# Patient Record
Sex: MALE | Employment: OTHER | URBAN - METROPOLITAN AREA
[De-identification: names, ages, dates, MRNs, and addresses within clinical notes are randomized per-mention and may not be internally consistent; named-entity substitution may affect disease eponyms.]

---

## 2020-01-13 ENCOUNTER — OFFICE VISIT (OUTPATIENT)
Dept: SURGERY | Facility: CLINIC | Age: 35
End: 2020-01-13
Payer: COMMERCIAL

## 2020-01-13 VITALS — TEMPERATURE: 99 F | SYSTOLIC BLOOD PRESSURE: 114 MMHG | HEART RATE: 56 BPM | DIASTOLIC BLOOD PRESSURE: 72 MMHG

## 2020-01-13 DIAGNOSIS — R82.90 URINE FINDINGS ABNORMAL: Primary | ICD-10-CM

## 2020-01-13 DIAGNOSIS — R68.82 DECREASED LIBIDO: ICD-10-CM

## 2020-01-13 LAB
APPEARANCE: CLEAR
MULTISTIX LOT#: ABNORMAL NUMERIC
PH, URINE: 5 (ref 4.5–8)
SPECIFIC GRAVITY: 1.01 (ref 1–1.03)
URINE-COLOR: YELLOW
UROBILINOGEN,SEMI-QN: 5 MG/DL (ref 0–1.9)

## 2020-01-13 PROCEDURE — 81003 URINALYSIS AUTO W/O SCOPE: CPT | Performed by: UROLOGY

## 2020-01-13 PROCEDURE — 99203 OFFICE O/P NEW LOW 30 MIN: CPT | Performed by: UROLOGY

## 2020-01-13 RX ORDER — MULTIVITAMIN
TABLET ORAL
COMMUNITY
End: 2021-02-17 | Stop reason: ALTCHOICE

## 2020-01-13 NOTE — PROGRESS NOTES
Rooming Clinician:     Estela Dobbs is a 30year old male. Patient presents with: Other: penile fracture about 6 years ago        HPI:     Patient comes to the office with a history of having had some bloody ejaculate 8 years ago.   At that time he felt a lesions  HEENT: atraumatic, normocephalic,ears and throat are clear  NECK: supple  LUNGS: normal respiratory motion without distress  CARDIO: normal peripheral perfusion  ABDOMEN: no masses,  tenderness, or hernia  : The penis is uncircumcised.   Urethral

## 2020-01-18 ENCOUNTER — LAB ENCOUNTER (OUTPATIENT)
Dept: LAB | Age: 35
End: 2020-01-18
Attending: INTERNAL MEDICINE
Payer: COMMERCIAL

## 2020-01-18 ENCOUNTER — OFFICE VISIT (OUTPATIENT)
Dept: INTERNAL MEDICINE CLINIC | Facility: CLINIC | Age: 35
End: 2020-01-18
Payer: COMMERCIAL

## 2020-01-18 VITALS
SYSTOLIC BLOOD PRESSURE: 111 MMHG | HEIGHT: 64 IN | WEIGHT: 143 LBS | DIASTOLIC BLOOD PRESSURE: 68 MMHG | HEART RATE: 57 BPM | TEMPERATURE: 97 F | BODY MASS INDEX: 24.41 KG/M2

## 2020-01-18 DIAGNOSIS — R06.81 APNEA: ICD-10-CM

## 2020-01-18 DIAGNOSIS — Z11.3 SCREENING FOR STD (SEXUALLY TRANSMITTED DISEASE): ICD-10-CM

## 2020-01-18 DIAGNOSIS — Z00.00 ANNUAL PHYSICAL EXAM: ICD-10-CM

## 2020-01-18 DIAGNOSIS — Z23 NEED FOR TDAP VACCINATION: ICD-10-CM

## 2020-01-18 DIAGNOSIS — R06.83 SNORING: ICD-10-CM

## 2020-01-18 DIAGNOSIS — Z23 NEED FOR INFLUENZA VACCINATION: ICD-10-CM

## 2020-01-18 DIAGNOSIS — R68.82 DECREASED LIBIDO: ICD-10-CM

## 2020-01-18 DIAGNOSIS — Z00.00 ANNUAL PHYSICAL EXAM: Primary | ICD-10-CM

## 2020-01-18 LAB
ALBUMIN SERPL-MCNC: 4.2 G/DL (ref 3.4–5)
ALBUMIN/GLOB SERPL: 1.3 {RATIO} (ref 1–2)
ALP LIVER SERPL-CCNC: 96 U/L (ref 45–117)
ALT SERPL-CCNC: 50 U/L (ref 16–61)
ANION GAP SERPL CALC-SCNC: 5 MMOL/L (ref 0–18)
AST SERPL-CCNC: 20 U/L (ref 15–37)
BILIRUB SERPL-MCNC: 1 MG/DL (ref 0.1–2)
BUN BLD-MCNC: 13 MG/DL (ref 7–18)
BUN/CREAT SERPL: 12 (ref 10–20)
CALCIUM BLD-MCNC: 9.3 MG/DL (ref 8.5–10.1)
CHLORIDE SERPL-SCNC: 110 MMOL/L (ref 98–112)
CHOLEST SMN-MCNC: 195 MG/DL (ref ?–200)
CO2 SERPL-SCNC: 26 MMOL/L (ref 21–32)
CREAT BLD-MCNC: 1.08 MG/DL (ref 0.7–1.3)
DEPRECATED RDW RBC AUTO: 36.3 FL (ref 35.1–46.3)
ERYTHROCYTE [DISTWIDTH] IN BLOOD BY AUTOMATED COUNT: 11.4 % (ref 11–15)
EST. AVERAGE GLUCOSE BLD GHB EST-MCNC: 97 MG/DL (ref 68–126)
GLOBULIN PLAS-MCNC: 3.3 G/DL (ref 2.8–4.4)
GLUCOSE BLD-MCNC: 98 MG/DL (ref 70–99)
HBA1C MFR BLD HPLC: 5 % (ref ?–5.7)
HBV SURFACE AG SER-ACNC: <0.1 [IU]/L
HBV SURFACE AG SERPL QL IA: NONREACTIVE
HCT VFR BLD AUTO: 48.2 % (ref 39–53)
HCV AB SERPL QL IA: NONREACTIVE
HDLC SERPL-MCNC: 36 MG/DL (ref 40–59)
HGB BLD-MCNC: 16.1 G/DL (ref 13–17.5)
LDLC SERPL CALC-MCNC: 123 MG/DL (ref ?–100)
M PROTEIN MFR SERPL ELPH: 7.5 G/DL (ref 6.4–8.2)
MCH RBC QN AUTO: 29.1 PG (ref 26–34)
MCHC RBC AUTO-ENTMCNC: 33.4 G/DL (ref 31–37)
MCV RBC AUTO: 87 FL (ref 80–100)
NONHDLC SERPL-MCNC: 159 MG/DL (ref ?–130)
OSMOLALITY SERPL CALC.SUM OF ELEC: 292 MOSM/KG (ref 275–295)
PATIENT FASTING Y/N/NP: YES
PATIENT FASTING Y/N/NP: YES
PLATELET # BLD AUTO: 231 10(3)UL (ref 150–450)
POTASSIUM SERPL-SCNC: 3.9 MMOL/L (ref 3.5–5.1)
RBC # BLD AUTO: 5.54 X10(6)UL (ref 4.3–5.7)
SODIUM SERPL-SCNC: 141 MMOL/L (ref 136–145)
TRIGL SERPL-MCNC: 182 MG/DL (ref 30–149)
TSI SER-ACNC: 0.48 MIU/ML (ref 0.36–3.74)
VLDLC SERPL CALC-MCNC: 36 MG/DL (ref 0–30)
WBC # BLD AUTO: 6.2 X10(3) UL (ref 4–11)

## 2020-01-18 PROCEDURE — 90686 IIV4 VACC NO PRSV 0.5 ML IM: CPT | Performed by: INTERNAL MEDICINE

## 2020-01-18 PROCEDURE — 90471 IMMUNIZATION ADMIN: CPT | Performed by: INTERNAL MEDICINE

## 2020-01-18 PROCEDURE — 83036 HEMOGLOBIN GLYCOSYLATED A1C: CPT

## 2020-01-18 PROCEDURE — 87340 HEPATITIS B SURFACE AG IA: CPT

## 2020-01-18 PROCEDURE — 84403 ASSAY OF TOTAL TESTOSTERONE: CPT

## 2020-01-18 PROCEDURE — 36415 COLL VENOUS BLD VENIPUNCTURE: CPT

## 2020-01-18 PROCEDURE — 80061 LIPID PANEL: CPT

## 2020-01-18 PROCEDURE — 99385 PREV VISIT NEW AGE 18-39: CPT | Performed by: INTERNAL MEDICINE

## 2020-01-18 PROCEDURE — 86780 TREPONEMA PALLIDUM: CPT

## 2020-01-18 PROCEDURE — 84443 ASSAY THYROID STIM HORMONE: CPT

## 2020-01-18 PROCEDURE — 80053 COMPREHEN METABOLIC PANEL: CPT

## 2020-01-18 PROCEDURE — 84402 ASSAY OF FREE TESTOSTERONE: CPT

## 2020-01-18 PROCEDURE — 86803 HEPATITIS C AB TEST: CPT

## 2020-01-18 PROCEDURE — 85027 COMPLETE CBC AUTOMATED: CPT

## 2020-01-18 PROCEDURE — 87389 HIV-1 AG W/HIV-1&-2 AB AG IA: CPT

## 2020-01-18 PROCEDURE — 90472 IMMUNIZATION ADMIN EACH ADD: CPT | Performed by: INTERNAL MEDICINE

## 2020-01-18 PROCEDURE — 90715 TDAP VACCINE 7 YRS/> IM: CPT | Performed by: INTERNAL MEDICINE

## 2020-01-18 NOTE — PATIENT INSTRUCTIONS
Some General Health and Preventive Recommendations:  • Perform moderate to vigorous physical activity of at least 30-45 minutes a day, as tolerated, 3 to 4 times per week; include cardiovascular exercise like running/elliptical/swimming, as well as resis possible disorders or diseases in people who don't have any symptoms.  The goal is to find a disease early so lifestyle changes can be made and you can be watched more closely to reduce the risk of disease, or to detect it early enough to treat it most effe given at least 4 weeks after the first dose   Hepatitis A Men at increased risk for infection – talk with your healthcare provider 2 doses given at least 6 months apart   Hepatitis B Men at increased risk for infection – talk with your healthcare provider years of age, who are not up-to-date on their childhood immunizations, should receive all appropriate catch-up vaccines recommended by the CDC. Date Last Reviewed: 2/1/2017  © 9798-1160 [employer]. [address] [facility], [address] [place] [address] [place]. lose weight, tone your muscles, and make your lungs work better. These changes may help improve your snoring. Ask your healthcare provider about an exercise program like walking, or something else that you enjoy.   Unblock your nose  If something blocks you apnea.      Snoring Obstructive sleep apnea   Snoring  If your throat structures are too large or the muscles relax too much during sleep, the air passage may be partly blocked for a while (temporarily).  But over time the air gets past. As air from the nos

## 2020-01-18 NOTE — PROGRESS NOTES
History of Present Illness   Patient ID: Bere Reardon is a 30year old male. Chief Complaint: Physical      Bere Reardon is a pleasant 30year old male who presents for annual physical exam.   He feels well today.   He does state that he snores heavily, hi Height: 5' 4\" (1.626 m)     Body mass index is 24.55 kg/m². BP Readings from Last 3 Encounters:  01/18/20 : 111/68  01/13/20 : 114/72    Physical Exam   Vitals reviewed. Constitutional: He is oriented to person, place, and time.  Vital signs are normal. The ASCVD Risk score (Neil Price et al., 2013) failed to calculate for the following reasons:     The 2013 ASCVD risk score is only valid for ages 36 to 78    Medical History    Reviewed allergies:  No Known Allergies     Reviewed:  Patient Active Problem Return if symptoms worsen or fail to improve, for 1 1 Josh Rdz; [address]       Alba Dyson MD  Internal Medicine    Medications This Visit:  Requested Prescriptions      No prescriptions requested or ordered in this encounter ? Ensure you are getting adequate sleep of at least 7-8 hours per night, meditation can help you relax, try melatonin 0.5mg -1 mg, avoid electronic at night. ? Practice safe sex with the use of condoms. ?  Use sunscreen whenever you are out in the sun a Diabetes mellitus, type 2 All men who have no symptoms but are overweight or obese and have 1 or more other risk factors for diabetes At least every 3 years (yearly if blood sugar has already started to rise)   Diabetes mellitus, type 2  All men with predi Measles, mumps, rubella (MMR) All men in this age group who have no record of these infections or vaccines 1 or 2 doses through age 54   Meningococcal Men at increased risk for infection – talk with your healthcare provider 1 or more doses   Pneumococca (P Sleeping on your side may keep throat tissue from blocking your air passage. This may improve or even cure snoring. But it can be hard to stop sleeping on your back. Try sewing a pocket or sock onto the back of a T-shirt or pajama top.  Put a few tennis bal If something blocks your nose, treating the problem may help improve snoring. Your healthcare provider can suggest medicines for allergies or sinus problems. Nasal saline rinses can also open up the nasal passages.  Nasal strips applied on the bridge of the If your throat structures are too large or the muscles relax too much during sleep, the air passage may be partly blocked for a while (temporarily).  But over time the air gets past. As air from the nose or mouth passes around this blockage, the throat stru

## 2020-01-20 LAB — T PALLIDUM AB SER QL: NEGATIVE

## 2020-01-22 LAB
TESTOSTERONE, FREE, S: 13.5 NG/DL
TESTOSTERONE, TOTAL, S: 467 NG/DL

## 2020-02-12 NOTE — H&P
[facility], [facility] - Gastroenterology                                                                                                  Clinic History and Physical RRR  Resp: non-labored breathing  Abd: soft, non-tender, non-distended  Rectal (female MA chaperone present): prolapsed hemorrhoids difficult to reduce without thrombosis apparent; no blood; no palpable internal mass though difficult to perform due to disc Emilie Quiñones, [address] [facility] - Gastroenterology  2/13/2020

## 2020-02-13 ENCOUNTER — TELEPHONE (OUTPATIENT)
Dept: GASTROENTEROLOGY | Facility: CLINIC | Age: 35
End: 2020-02-13

## 2020-02-13 ENCOUNTER — OFFICE VISIT (OUTPATIENT)
Dept: GASTROENTEROLOGY | Facility: CLINIC | Age: 35
End: 2020-02-13
Payer: COMMERCIAL

## 2020-02-13 VITALS
SYSTOLIC BLOOD PRESSURE: 111 MMHG | BODY MASS INDEX: 23.87 KG/M2 | DIASTOLIC BLOOD PRESSURE: 58 MMHG | HEART RATE: 74 BPM | WEIGHT: 139.81 LBS | HEIGHT: 64 IN

## 2020-02-13 DIAGNOSIS — K62.89 RECTAL DISCOMFORT: ICD-10-CM

## 2020-02-13 DIAGNOSIS — K62.5 RECTAL BLEEDING: Primary | ICD-10-CM

## 2020-02-13 DIAGNOSIS — K62.5 RECTAL BLEEDING: ICD-10-CM

## 2020-02-13 DIAGNOSIS — K64.2 GRADE III HEMORRHOIDS: Primary | ICD-10-CM

## 2020-02-13 DIAGNOSIS — K64.2 GRADE III HEMORRHOIDS: ICD-10-CM

## 2020-02-13 PROCEDURE — 99243 OFF/OP CNSLTJ NEW/EST LOW 30: CPT | Performed by: INTERNAL MEDICINE

## 2020-02-13 NOTE — PATIENT INSTRUCTIONS
1. Schedule colonoscopy with monitored anesthesia care (MAC) with Dr. Bruna Torres    2.  bowel prep from pharmacy ([facility]adwick Bazz[facility])    3. Continue all medications for procedure    4. Read all bowel prep instructions carefully    5.  AVOID seeds, nuts, p

## 2020-02-13 NOTE — TELEPHONE ENCOUNTER
Scheduled for:  Colonoscopy - [phone]  Provider Name:  Deleta Route  Date:  4/14/2020  Location:  [facility]  Sedation:  Mac  Time:  18 Pm --------- Helena Bledsoe 1215 Pm  Prep: Golytely  Meds/Allergies Reconciled?:  Physician reviewed  Diagnosis with codes: Rectal ble

## 2020-04-01 ENCOUNTER — TELEPHONE (OUTPATIENT)
Dept: GASTROENTEROLOGY | Facility: CLINIC | Age: 35
End: 2020-04-01

## 2020-04-01 DIAGNOSIS — K62.89 RECTAL DISCOMFORT: ICD-10-CM

## 2020-04-01 DIAGNOSIS — K64.2 GRADE III HEMORRHOIDS: ICD-10-CM

## 2020-04-01 DIAGNOSIS — K62.5 RECTAL BLEEDING: Primary | ICD-10-CM

## 2020-04-01 NOTE — TELEPHONE ENCOUNTER
Scheduled for:  Colonoscopy - [facility]  Provider Name:  Dr. Ayala Board  Date:  FROM - 4/14/20         TO - 6/9/20  Location:  [facility]  Sedation:  MAC  Time:  FROM - 1:15 pm            TO - 12:30 pm  Prep:  Golytely  Meds/Allergies Reconciled?:  Physician reviewed

## 2020-05-26 ENCOUNTER — TELEPHONE (OUTPATIENT)
Dept: GASTROENTEROLOGY | Facility: CLINIC | Age: 35
End: 2020-05-26

## 2020-05-26 NOTE — TELEPHONE ENCOUNTER
Patient states he is still having problems finding someone to take him home after his procedure. Patient will call back if he needs to cancel.

## 2020-05-28 ENCOUNTER — TELEPHONE (OUTPATIENT)
Dept: GASTROENTEROLOGY | Facility: CLINIC | Age: 35
End: 2020-05-28

## 2020-05-28 DIAGNOSIS — K64.2 GRADE III HEMORRHOIDS: ICD-10-CM

## 2020-05-28 DIAGNOSIS — K62.89 RECTAL DISCOMFORT: ICD-10-CM

## 2020-05-28 DIAGNOSIS — K62.5 RECTAL BLEEDING: Primary | ICD-10-CM

## 2020-05-28 NOTE — TELEPHONE ENCOUNTER
Scheduled for:  Colonoscopy 53499  Provider Name:  Dr. Hinds Honor  Date:    From-6/9/20  To-6/1/20  [facility]  Sedation:  MAC  Time:   3500 (pt is aware to arrive at 1130)   Prep:  Golytely, sent via Cnekt/Allergies Reconciled?

## 2020-05-31 ENCOUNTER — TELEPHONE (OUTPATIENT)
Dept: GASTROENTEROLOGY | Facility: CLINIC | Age: 35
End: 2020-05-31

## 2020-05-31 NOTE — TELEPHONE ENCOUNTER
The patient was inquiring about the \"tablets\" that were not sent to his pharmacy for his procedural preparation tomorrow. .  We reviewed his instructions. He is referring to the simethicone tablets which are OTC. A prescription is not required.   He alre

## 2020-06-01 ENCOUNTER — TELEPHONE (OUTPATIENT)
Dept: GASTROENTEROLOGY | Facility: CLINIC | Age: 35
End: 2020-06-01

## 2020-06-01 DIAGNOSIS — K62.5 RECTAL BLEEDING: Primary | ICD-10-CM

## 2020-06-01 DIAGNOSIS — K62.89 RECTAL DISCOMFORT: ICD-10-CM

## 2020-06-01 DIAGNOSIS — K64.2 GRADE III HEMORRHOIDS: ICD-10-CM

## 2020-06-01 NOTE — TELEPHONE ENCOUNTER
Message left with answering service     RE CANCELLING COLONOSCOPY AT 11AM, HAD A FAMILY EMERGENCY, REQ CALL BACK TO RESCHEDULE

## 2020-06-01 NOTE — TELEPHONE ENCOUNTER
Spoke to Endoscopy RN Aidan Coe to inform of patient cancellation today. Pt removed from provider schedule and cancellation request sent to endo. GI Schedulers-  Please contact the pt to schedule, see TE from 5/28/2020 for orders, thank you.

## 2021-02-17 ENCOUNTER — OFFICE VISIT (OUTPATIENT)
Dept: INTERNAL MEDICINE CLINIC | Facility: CLINIC | Age: 36
End: 2021-02-17
Payer: COMMERCIAL

## 2021-02-17 VITALS
HEART RATE: 55 BPM | DIASTOLIC BLOOD PRESSURE: 63 MMHG | WEIGHT: 139 LBS | HEIGHT: 63 IN | BODY MASS INDEX: 24.63 KG/M2 | SYSTOLIC BLOOD PRESSURE: 106 MMHG | TEMPERATURE: 97 F

## 2021-02-17 DIAGNOSIS — Z00.00 ANNUAL PHYSICAL EXAM: Primary | ICD-10-CM

## 2021-02-17 DIAGNOSIS — R06.83 LOUD SNORING: ICD-10-CM

## 2021-02-17 DIAGNOSIS — R53.83 FATIGUE, UNSPECIFIED TYPE: ICD-10-CM

## 2021-02-17 DIAGNOSIS — K64.9 HEMORRHOIDS, UNSPECIFIED HEMORRHOID TYPE: ICD-10-CM

## 2021-02-17 DIAGNOSIS — T78.40XA ALLERGY, INITIAL ENCOUNTER: ICD-10-CM

## 2021-02-17 PROCEDURE — 3008F BODY MASS INDEX DOCD: CPT | Performed by: INTERNAL MEDICINE

## 2021-02-17 PROCEDURE — 99395 PREV VISIT EST AGE 18-39: CPT | Performed by: INTERNAL MEDICINE

## 2021-02-17 PROCEDURE — 3074F SYST BP LT 130 MM HG: CPT | Performed by: INTERNAL MEDICINE

## 2021-02-17 PROCEDURE — 3078F DIAST BP <80 MM HG: CPT | Performed by: INTERNAL MEDICINE

## 2021-02-17 RX ORDER — HYDROCORTISONE 25 MG/G
CREAM TOPICAL
COMMUNITY
Start: 2021-02-08

## 2021-02-17 NOTE — PROGRESS NOTES
History of Present Illness   Patient ID: Marco Antonio Marks is a 37year old male. Chief Complaint: Physical      Nicholas Luis Lewis is a pleasant 37year old male who presents for annual physical exam. Marco Antonio Marks is doing well today. sounds and normal pulses. Edema not present. Pulmonary/Chest: Effort normal and breath sounds normal.   Abdominal: Soft. Normal appearance and bowel sounds are normal. There is no abdominal tenderness.    Neurological: He is alert and oriented to [facility] No Known Problems Father    • No Known Problems Mother        Reviewed:  History reviewed. No pertinent surgical history.    Reviewed:  Social History    Tobacco Use      Smoking status: Former Smoker        Years: 10.00        Types: Cigarettes        Quit effects discussed. All questions answered to best of ability. Call office with any questions. Seek emergency care if necessary. Patient understands and agrees to follow directions and advice.       ----------------------------------------- PATIENT INSTR lonely or bored Call a friend to talk   Tips for quitting successfully  · List the benefits of quitting such as reducing heart risks and saving money. Keep this list and review it whenever you feel like smoking. · Get support.  Let your friends know you ma can be very helpful. Group members can support each other both during and outside of group meetings. Counseling, even by telephone, is also effective for some people.  Ask your healthcare provider, local hospital, or public health department about available ways to stop smoking. Keep in mind the health benefits of quitting  The health benefits of quitting start right away. They keep improving the longer you go without smoking. Knowing this can help inspire you to stay on track.  These benefits occur at any include a nicotine patch, gum, and lozenges. But it's best to use these under the care of your healthcare provider. The skin patch gives a steady supply of nicotine. Nicotine gum and lozenges give short-time doses of low levels of nicotine.  Both methods re quit line by calling the [address]. · Nicotine patches. They give you a measured dose of nicotine through your skin to fight cravings. And you can buy patches without a prescription. Several types and strengths are available.  The one you cho cravings. So it reduces withdrawal symptoms. It has the active ingredient bupropion. This is used as an antidepressant. You can use it alone or with nicotine-replacement therapy. · Varenicline.  This oral prescription medicine reduces nicotine withdrawal s ___  I want to have better-smelling hair, clothes, home, and car.   ___  I want to save time by not having to take smoke breaks, buy tobacco products, or hunt for a light.   ___  I want to have whiter teeth and fresher breath.    Family benefits  ___  I w professional's instructions. Staying Smoke-Free  Quitting smoking is a big change. People will congratulate you. You have the right to be proud. But later at times you may miss smoking. Plan ahead to resist temptation.    Prepare to be tempted  · If all the benefits of staying quit. Millions of people have given up smoking. You can too. To learn more  · www.cdc.gov/tobacco/quit_smoking/ 800-QUIT-NOW (753-425-7614)  · www.smokefree. gov 877-44U-QUIT (191-341-2376)  · www.lung.org/stop-smoking/ 800-BHARATH pain.  ? Topical products. Your healthcare provider may prescribe or recommend creams, ointments, or pads that can be applied to the hemorrhoid. Use these exactly as directed. Tips to help prevent hemorrhoids   · Eat more fiber.  Fiber adds bulk to stool a intended as a substitute for professional medical care. Always follow your healthcare professional's instructions. Treating Hemorrhoids: Removal  If your hemorrhoid symptoms persist, your healthcare provider may recommend removing the hemorrhoid.  Tin Roberson minutes. You may need to rest for a few days before returning to work. · Numbing the hemorrhoid. You’ll be asked to lie or kneel on a table. The hemorrhoid is then injected with a local anesthetic. This may cause some discomfort for a moment.  But within bath water. Soaking for 10 minutes twice a day can provide relief from painful hemorrhoids. It can also help the area stay clean. · Develop good bowel habits. Use the bathroom when you need to. Don’t ignore the urge to move your bowels.  This can lead to c such as prune juice or apple juice, can also help prevent constipation. Get more exercise  Regular exercise aids digestion and helps prevent constipation. It’s also great for your health.  So talk with your healthcare provider about starting an exercise pr tissues stay enlarged. They may become inflamed and cause symptoms.  This can happen both inside and outside the anal canal.     Parts of the anal canal  The parts of the anal canal are:   · Internal hemorrhoid tissue is in the upper area of the anal canal. they thrombose (form a blood clot). When this happens, a hard, bluish lump may appear. A thrombosed hemorrhoid also causes sudden, severe pain. In time, the clot may go away on its own. This sometimes leaves a “skin tag” of tissue stretched by the clot.   H

## 2021-02-17 NOTE — PATIENT INSTRUCTIONS
Kicking the Smoking Habit   If you smoke, it doubles your risk of heart disease. Quitting is one of the best changes you can make for your heart and your overall health. Your risk of heart attack goes down within 1 day of putting out that last cigarette. before without success, this time stay away from the triggers that may cause the relapse. · Make the most of slip-ups. Try to learn from them, and then get back on track. · Be accountable to your friends and your calendar so that you stay on track.     Fo look for programs on the Internet, such as  smokefree.gov. · Family and friends. Tell your family and friends about your quit date and ask for their support. If they smoke, arrange to see them in smoke-free places. Don’t allow smoking in your home or car. benefits after your last cigarette include:   · After 20 minutes: Your blood pressure and pulse return to normal.  · After 8 hours: Your oxygen levels return to normal.  · After 2 days:  Your ability to smell and taste start to improve as damaged nerves reg weakness, or a fast heartbeat, stop using these products. See your provider.    Ask about prescription medicine  After reviewing your smoking patterns and past attempts to quit, your healthcare provider may offer a prescription medicine such as bupropion, v decrease your dose. These patches have been known to cause trouble sleeping. If this is the case, remove the patch before going to bed. Replace it when you wake up. · Nicotine gum. This fast-acting form of nicotine replacement doesn't need a prescription. can include changes in mood or behavior. It is important to use this medicine under medical supervision. Quit-smoking aids can help you have a smoke-free future.  But it's also smart to develop a plan to change your personal habits and set up a network of healthy example for my child.   ___  I want to have the energy needed to play with my children and not become out-of-breath   ___  I want to reduce my family’s cancer risk.    Financial benefits  ___  I want to save hundreds of dollars each year that would water. Call a friend, take a walk, or try deep breathing. Chew gum. Usually, the urge to smoke will pass. · Don’t trust yourself to have “just one cigarette.” Many ex-smokers get hooked again that way. · Remind yourself why you quit.  Tell yourself you ca 5/1/2019  © 7493-6958 [employer]. All rights reserved. This information is not intended as a substitute for professional medical care. Always follow your healthcare professional's instructions.         Hemorrhoids    Hemorrhoids are swollen a pass.  ? Increase the fiber in your diet with more fiber-rich foods. These include fresh fruit, vegetables, and whole grains. ? Take a fiber supplement or bulking agent, if advised by your healthcare provider.  These include products such as psyllium or me cases, no special preparation is needed. Keep in mind that your treatment may differ depending on your symptoms and the location of the hemorrhoid. Internal hemorrhoids  You’ll be asked to lie or kneel on a table.  Your healthcare provider then i causing pain. · Removing the hemorrhoid. A small incision is made to remove the blood clot. The hemorrhoid may also be removed. The skin is then either closed with stitches or left open to heal on its own.  The area around the incision will likely be sore only as long as needed. Wipe gently with soft, unscented toilet tissue or baby wipes. · Use ice packs. Placing an ice pack on an external hemorrhoid can help relieve pain right away. It will also help reduce the blood clot.  Use the ice for 15 to 20 minute Take it easy at first. And remember to drink plenty of water when you exercise. High-fiber foods Easy ways to add fiber  High-fiber foods offer many benefits. By making your stools softer, they help heal and prevent swollen hemorrhoids.  They may also help prior to bowel movements. · Anal sphincters are ring-shaped muscles that expand and contract to control the anal opening. · External hemorrhoid tissue lies under the anal skin. · The anus is the passage between the rectum and the outside of the body.   Torey Tyson sensation  · Bleeding during bowel movements  · Protrusion of tissue from the anus  · Feeling of blockage or fullness in the rectum during bowel movements  · Itching around the anus  · Mucus discharge from the anus  Causes of hemorrhoids  There’s no single

## 2021-03-04 ENCOUNTER — LAB ENCOUNTER (OUTPATIENT)
Dept: LAB | Facility: REFERENCE LAB | Age: 36
End: 2021-03-04
Attending: ALLERGY & IMMUNOLOGY
Payer: COMMERCIAL

## 2021-03-04 ENCOUNTER — NURSE ONLY (OUTPATIENT)
Dept: ALLERGY | Facility: CLINIC | Age: 36
End: 2021-03-04
Payer: COMMERCIAL

## 2021-03-04 ENCOUNTER — OFFICE VISIT (OUTPATIENT)
Dept: ALLERGY | Facility: CLINIC | Age: 36
End: 2021-03-04
Payer: COMMERCIAL

## 2021-03-04 VITALS
HEART RATE: 58 BPM | SYSTOLIC BLOOD PRESSURE: 101 MMHG | DIASTOLIC BLOOD PRESSURE: 63 MMHG | BODY MASS INDEX: 24.8 KG/M2 | HEIGHT: 63 IN | WEIGHT: 140 LBS

## 2021-03-04 DIAGNOSIS — J30.89 PERENNIAL ALLERGIC RHINITIS: ICD-10-CM

## 2021-03-04 DIAGNOSIS — Z91.018 FOOD ALLERGY: ICD-10-CM

## 2021-03-04 DIAGNOSIS — T78.1XXA ADVERSE FOOD REACTION, INITIAL ENCOUNTER: ICD-10-CM

## 2021-03-04 DIAGNOSIS — K21.9 GASTROESOPHAGEAL REFLUX DISEASE, UNSPECIFIED WHETHER ESOPHAGITIS PRESENT: ICD-10-CM

## 2021-03-04 DIAGNOSIS — L29.9 PRURITUS: Primary | ICD-10-CM

## 2021-03-04 DIAGNOSIS — J30.89 ENVIRONMENTAL AND SEASONAL ALLERGIES: ICD-10-CM

## 2021-03-04 DIAGNOSIS — R09.81 NASAL CONGESTION: ICD-10-CM

## 2021-03-04 PROCEDURE — 86003 ALLG SPEC IGE CRUDE XTRC EA: CPT

## 2021-03-04 PROCEDURE — 3074F SYST BP LT 130 MM HG: CPT | Performed by: ALLERGY & IMMUNOLOGY

## 2021-03-04 PROCEDURE — 3078F DIAST BP <80 MM HG: CPT | Performed by: ALLERGY & IMMUNOLOGY

## 2021-03-04 PROCEDURE — 95024 IQ TESTS W/ALLERGENIC XTRCS: CPT | Performed by: ALLERGY & IMMUNOLOGY

## 2021-03-04 PROCEDURE — 36415 COLL VENOUS BLD VENIPUNCTURE: CPT

## 2021-03-04 PROCEDURE — 3008F BODY MASS INDEX DOCD: CPT | Performed by: ALLERGY & IMMUNOLOGY

## 2021-03-04 PROCEDURE — 99244 OFF/OP CNSLTJ NEW/EST MOD 40: CPT | Performed by: ALLERGY & IMMUNOLOGY

## 2021-03-04 PROCEDURE — 95004 PERQ TESTS W/ALRGNC XTRCS: CPT | Performed by: ALLERGY & IMMUNOLOGY

## 2021-03-04 RX ORDER — FLUTICASONE PROPIONATE 50 MCG
2 SPRAY, SUSPENSION (ML) NASAL DAILY
Qty: 1 BOTTLE | Refills: 0 | Status: SHIPPED | OUTPATIENT
Start: 2021-03-04 | End: 2021-04-19

## 2021-03-04 NOTE — PROGRESS NOTES
Kody Scott is a 37year old male. HPI:   Patient presents with:  Food Allergy: vegetarian, feels some itching on skin after eating Chelly food.  no itching throat/mouth  Nasal Congestion    Patient is a 31-year-old male who presents for aller EXTERNALLY TO THE AFFECTED AREA 2 TO 4 TIMES DAILY         Allergies:  No Known Allergies      ROS:     Allergic/Immuno:  See HPI  Cardiovascular:  Negative for irregular heartbeat/palpitations, chest pain, edema  Constitutional:  Negative night sweats,lauren congestion    Skin testing today to common indoor and outdoor environmental allergens and evaluation of his nasal congestion potential allergic triggers was negative.       Skin testing today to select foods available including peas, green beans, lima bean Suspension 1 Bottle 0     Si sprays by Nasal route daily.        Imaging & Referrals:  None     3/4/2021  Avni Hanley MD      If medication samples were provided today, they were provided solely for patient education and training related to self a

## 2021-03-04 NOTE — PATIENT INSTRUCTIONS
1.  Food allergy versus adverse food reaction  Patient reports symptoms of tingling to his skin on his upper arms within 4 to 5 hours after eating rice and Khris([place] food typically consisting of peas beans  lentils)   Patient denies rashes hives or respir

## 2021-03-07 LAB
ALLERGEN, LENTIL IGE: <0.1 KU/L
ALLERGEN, LIMA/WHITE BEAN: <0.1 KU/L
KIDNEY BEAN (RED), IGE: <0.1 KU/L

## 2021-03-08 ENCOUNTER — TELEPHONE (OUTPATIENT)
Dept: ALLERGY | Facility: CLINIC | Age: 36
End: 2021-03-08

## 2021-03-08 NOTE — TELEPHONE ENCOUNTER
----- Message from Kayode Sebastian MD sent at 3/8/2021  7:29 AM CST -----  Please call patient with negative serum Ig testing to kidney bean, lima bean, lentil

## 2021-03-08 NOTE — TELEPHONE ENCOUNTER
Black bean IgE pending. Will call back when that result has returned. Pt contacted, confirmed name, and . Pt verbalizes understanding, and denies further questions. Discussed Dr. Mimi Ramirez office visit note below.    1.  Food allergy versus adver

## 2021-03-10 LAB — BLACK BEAN, IGE: <0.35 KU/L

## 2021-03-17 ENCOUNTER — LAB ENCOUNTER (OUTPATIENT)
Dept: LAB | Age: 36
End: 2021-03-17
Attending: INTERNAL MEDICINE
Payer: COMMERCIAL

## 2021-03-17 DIAGNOSIS — Z00.00 ANNUAL PHYSICAL EXAM: ICD-10-CM

## 2021-03-17 LAB
ALBUMIN SERPL-MCNC: 4.2 G/DL (ref 3.4–5)
ALBUMIN/GLOB SERPL: 1.6 {RATIO} (ref 1–2)
ALP LIVER SERPL-CCNC: 99 U/L
ALT SERPL-CCNC: 34 U/L
ANION GAP SERPL CALC-SCNC: 5 MMOL/L (ref 0–18)
AST SERPL-CCNC: 19 U/L (ref 15–37)
BILIRUB SERPL-MCNC: 0.9 MG/DL (ref 0.1–2)
BUN BLD-MCNC: 9 MG/DL (ref 7–18)
BUN/CREAT SERPL: 8.6 (ref 10–20)
CALCIUM BLD-MCNC: 9.2 MG/DL (ref 8.5–10.1)
CHLORIDE SERPL-SCNC: 110 MMOL/L (ref 98–112)
CHOLEST SMN-MCNC: 193 MG/DL (ref ?–200)
CO2 SERPL-SCNC: 26 MMOL/L (ref 21–32)
CREAT BLD-MCNC: 1.05 MG/DL
DEPRECATED RDW RBC AUTO: 35.8 FL (ref 35.1–46.3)
ERYTHROCYTE [DISTWIDTH] IN BLOOD BY AUTOMATED COUNT: 11.4 % (ref 11–15)
EST. AVERAGE GLUCOSE BLD GHB EST-MCNC: 97 MG/DL (ref 68–126)
GLOBULIN PLAS-MCNC: 2.7 G/DL (ref 2.8–4.4)
GLUCOSE BLD-MCNC: 95 MG/DL (ref 70–99)
HBA1C MFR BLD HPLC: 5 % (ref ?–5.7)
HCT VFR BLD AUTO: 43.9 %
HDLC SERPL-MCNC: 35 MG/DL (ref 40–59)
HGB BLD-MCNC: 14.9 G/DL
LDLC SERPL CALC-MCNC: 133 MG/DL (ref ?–100)
M PROTEIN MFR SERPL ELPH: 6.9 G/DL (ref 6.4–8.2)
MCH RBC QN AUTO: 29.2 PG (ref 26–34)
MCHC RBC AUTO-ENTMCNC: 33.9 G/DL (ref 31–37)
MCV RBC AUTO: 85.9 FL
NONHDLC SERPL-MCNC: 158 MG/DL (ref ?–130)
OSMOLALITY SERPL CALC.SUM OF ELEC: 290 MOSM/KG (ref 275–295)
PATIENT FASTING Y/N/NP: YES
PATIENT FASTING Y/N/NP: YES
PLATELET # BLD AUTO: 209 10(3)UL (ref 150–450)
POTASSIUM SERPL-SCNC: 3.8 MMOL/L (ref 3.5–5.1)
RBC # BLD AUTO: 5.11 X10(6)UL
SODIUM SERPL-SCNC: 141 MMOL/L (ref 136–145)
TRIGL SERPL-MCNC: 127 MG/DL (ref 30–149)
TSI SER-ACNC: 0.54 MIU/ML (ref 0.36–3.74)
VLDLC SERPL CALC-MCNC: 25 MG/DL (ref 0–30)
WBC # BLD AUTO: 5.2 X10(3) UL (ref 4–11)

## 2021-03-17 PROCEDURE — 85027 COMPLETE CBC AUTOMATED: CPT

## 2021-03-17 PROCEDURE — 80053 COMPREHEN METABOLIC PANEL: CPT

## 2021-03-17 PROCEDURE — 83036 HEMOGLOBIN GLYCOSYLATED A1C: CPT

## 2021-03-17 PROCEDURE — 36415 COLL VENOUS BLD VENIPUNCTURE: CPT

## 2021-03-17 PROCEDURE — 80061 LIPID PANEL: CPT

## 2021-03-17 PROCEDURE — 84443 ASSAY THYROID STIM HORMONE: CPT

## 2021-03-31 NOTE — PROGRESS NOTES
[facility], [facility] - Gastroenterology                                                                                                  Clinic Progress Note    Patient pr Years since quittin.8      Smokeless tobacco: Never Used      Tobacco comment: quit recently    Vaping Use      Vaping Use: Never used    Alcohol use: Never    Drug use: Never       Medications (Active prior to today's visit):  Current Outpatient Medic evaluation which did not occur due to pandemic. Returns today and discussed recommendations as last time with plan for colonoscopy and subsequent surgical evaluation.     Recommend:  -colonoscopy with MAC with split moviprep at the hospital  -surgical evalu

## 2021-04-01 ENCOUNTER — TELEPHONE (OUTPATIENT)
Dept: GASTROENTEROLOGY | Facility: CLINIC | Age: 36
End: 2021-04-01

## 2021-04-01 ENCOUNTER — OFFICE VISIT (OUTPATIENT)
Dept: GASTROENTEROLOGY | Facility: CLINIC | Age: 36
End: 2021-04-01
Payer: COMMERCIAL

## 2021-04-01 VITALS
WEIGHT: 139 LBS | HEIGHT: 63 IN | BODY MASS INDEX: 24.63 KG/M2 | TEMPERATURE: 99 F | DIASTOLIC BLOOD PRESSURE: 60 MMHG | SYSTOLIC BLOOD PRESSURE: 100 MMHG

## 2021-04-01 DIAGNOSIS — K62.89 RECTAL DISCOMFORT: ICD-10-CM

## 2021-04-01 DIAGNOSIS — K62.5 RECTAL BLEEDING: Primary | ICD-10-CM

## 2021-04-01 PROCEDURE — 3078F DIAST BP <80 MM HG: CPT | Performed by: INTERNAL MEDICINE

## 2021-04-01 PROCEDURE — 3008F BODY MASS INDEX DOCD: CPT | Performed by: INTERNAL MEDICINE

## 2021-04-01 PROCEDURE — 3074F SYST BP LT 130 MM HG: CPT | Performed by: INTERNAL MEDICINE

## 2021-04-01 PROCEDURE — 99214 OFFICE O/P EST MOD 30 MIN: CPT | Performed by: INTERNAL MEDICINE

## 2021-04-01 RX ORDER — PEG-3350, SODIUM SULFATE, SODIUM CHLORIDE, POTASSIUM CHLORIDE, SODIUM ASCORBATE AND ASCORBIC ACID 7.5-2.691G
KIT ORAL
Qty: 1 EACH | Refills: 1 | Status: SHIPPED | OUTPATIENT
Start: 2021-04-01 | End: 2021-04-08

## 2021-04-01 NOTE — TELEPHONE ENCOUNTER
Scheduled for:  Colonoscopy [address]59  Provider Name:  Dr. Jo Chow  Date:  4/22/2021 - ok'd by Dr. Jo Chow & Sweta/Benny  Location:  [facility]  Sedation:  MAC  Time:  12:30 pm, arrival 11:30 am  Prep:  Moviprep  Meds/Allergies Reconciled?:  Physician reviewed  Diag

## 2021-04-01 NOTE — PATIENT INSTRUCTIONS
1. Schedule colonoscopy with monitored anesthesia care (MAC) at the hospital with Dr. Liss Bobby    2.  bowel prep from pharmacy    I've prescribed Moviprep to your pharmacy. Please call us if this is not covered by your insurance.     The day before the solution with a straw. It may be easier to tolerate. Rarely, people may have nausea or vomiting with the prep. If this occurs, give yourself a 30 -minute break, rinse your mouth or brush your teeth, then  continue drinking the prep solution.   You may

## 2021-04-08 ENCOUNTER — TELEPHONE (OUTPATIENT)
Dept: GASTROENTEROLOGY | Facility: CLINIC | Age: 36
End: 2021-04-08

## 2021-04-08 NOTE — TELEPHONE ENCOUNTER
Current Outpatient Medications   Medication Sig Dispense Refill   • PEG-KCl-NaCl-NaSulf-Na Asc-C (MOVIPREP) 100 g Oral Recon Soln Take as directed 1 each 1     Per pharmacy this medication requires a Prior Auth.   To complete the Prior Auth visit:    Go.cov

## 2021-04-08 NOTE — TELEPHONE ENCOUNTER
Dr. Pedro Chen,    Patient's insurance does not cover Moviprep. The pharmacy has generic Golytely in stock. Order pended. Please sign if appropriate. Thank you.

## 2021-04-08 NOTE — TELEPHONE ENCOUNTER
Pop order signed    Thanks    Leigha Lancaster MD  [facility], [facility] - Gastroenterology  4/8/2021  3:45 PM

## 2021-04-19 ENCOUNTER — LAB ENCOUNTER (OUTPATIENT)
Dept: LAB | Facility: HOSPITAL | Age: 36
End: 2021-04-19
Attending: INTERNAL MEDICINE
Payer: COMMERCIAL

## 2021-04-19 DIAGNOSIS — Z01.818 PREOPERATIVE TESTING: ICD-10-CM

## 2021-04-19 RX ORDER — FLUTICASONE PROPIONATE 50 MCG
2 SPRAY, SUSPENSION (ML) NASAL DAILY
Qty: 3 BOTTLE | Refills: 2 | Status: SHIPPED | OUTPATIENT
Start: 2021-04-19 | End: 2022-01-04 | Stop reason: ALTCHOICE

## 2021-04-19 NOTE — TELEPHONE ENCOUNTER
Fax received from pharmacy that Fluticasone nasal spray is not covered. Dr. Shwetha Ruelas would you like the patient to buy OTC?      According to fax the preferred alternative may be: mometasone nasal spray

## 2021-04-19 NOTE — TELEPHONE ENCOUNTER
Refill requested for   Name from pharmacy: FLUTICASONE 50MCG NASAL SP (120) RX          Will file in chart as: FLUTICASONE PROPIONATE 50 MCG/ACT Nasal Suspension    Sig: SHAKE LIQUID AND USE 2 SPRAYS IN EACH NOSTRIL DAILY    Disp:  16 g    Refills:  0 (Pha

## 2021-04-19 NOTE — TELEPHONE ENCOUNTER
Patient contacted, instructed that Flonase is not covered by his insurance and informed  patient to buy fluticasone/generic Flonase over-the-counter. Freddie Aguilera or [facility]. Patient verbalized understanding of information.

## 2021-04-19 NOTE — TELEPHONE ENCOUNTER
Yes please have patient buy fluticasone/generic Flonase over-the-counter.   [facility] Jennifer Aguilera or [facility]

## 2021-04-20 ENCOUNTER — TELEPHONE (OUTPATIENT)
Dept: GASTROENTEROLOGY | Facility: CLINIC | Age: 36
End: 2021-04-20

## 2021-04-21 NOTE — TELEPHONE ENCOUNTER
Patient called phone room, call transferred to RN. Patient called with prep questions. I went over instructions with the patient. Patient verbalized understanding.

## 2021-04-22 ENCOUNTER — ANESTHESIA (OUTPATIENT)
Dept: ENDOSCOPY | Facility: HOSPITAL | Age: 36
End: 2021-04-22
Payer: COMMERCIAL

## 2021-04-22 ENCOUNTER — ANESTHESIA EVENT (OUTPATIENT)
Dept: ENDOSCOPY | Facility: HOSPITAL | Age: 36
End: 2021-04-22
Payer: COMMERCIAL

## 2021-04-22 ENCOUNTER — HOSPITAL ENCOUNTER (OUTPATIENT)
Facility: HOSPITAL | Age: 36
Setting detail: HOSPITAL OUTPATIENT SURGERY
Discharge: HOME | End: 2021-04-22
Attending: INTERNAL MEDICINE | Admitting: INTERNAL MEDICINE
Payer: COMMERCIAL

## 2021-04-22 VITALS
BODY MASS INDEX: 24.63 KG/M2 | DIASTOLIC BLOOD PRESSURE: 66 MMHG | OXYGEN SATURATION: 100 % | SYSTOLIC BLOOD PRESSURE: 109 MMHG | HEIGHT: 63 IN | RESPIRATION RATE: 18 BRPM | TEMPERATURE: 98 F | WEIGHT: 139 LBS | HEART RATE: 68 BPM

## 2021-04-22 DIAGNOSIS — K62.89 RECTAL DISCOMFORT: ICD-10-CM

## 2021-04-22 DIAGNOSIS — Z01.818 PREOPERATIVE TESTING: Primary | ICD-10-CM

## 2021-04-22 DIAGNOSIS — K62.5 RECTAL BLEEDING: ICD-10-CM

## 2021-04-22 PROCEDURE — 45378 DIAGNOSTIC COLONOSCOPY: CPT | Performed by: INTERNAL MEDICINE

## 2021-04-22 PROCEDURE — 0DJD8ZZ INSPECTION OF LOWER INTESTINAL TRACT, VIA NATURAL OR ARTIFICIAL OPENING ENDOSCOPIC: ICD-10-PCS | Performed by: INTERNAL MEDICINE

## 2021-04-22 RX ORDER — ONDANSETRON 2 MG/ML
4 INJECTION INTRAMUSCULAR; INTRAVENOUS ONCE AS NEEDED
Status: DISCONTINUED | OUTPATIENT
Start: 2021-04-22 | End: 2021-04-22

## 2021-04-22 RX ORDER — MORPHINE SULFATE 4 MG/ML
4 INJECTION, SOLUTION INTRAMUSCULAR; INTRAVENOUS EVERY 10 MIN PRN
Status: DISCONTINUED | OUTPATIENT
Start: 2021-04-22 | End: 2021-04-22

## 2021-04-22 RX ORDER — HYDROMORPHONE HYDROCHLORIDE 1 MG/ML
0.2 INJECTION, SOLUTION INTRAMUSCULAR; INTRAVENOUS; SUBCUTANEOUS EVERY 5 MIN PRN
Status: DISCONTINUED | OUTPATIENT
Start: 2021-04-22 | End: 2021-04-22

## 2021-04-22 RX ORDER — NALOXONE HYDROCHLORIDE 0.4 MG/ML
80 INJECTION, SOLUTION INTRAMUSCULAR; INTRAVENOUS; SUBCUTANEOUS AS NEEDED
Status: DISCONTINUED | OUTPATIENT
Start: 2021-04-22 | End: 2021-04-22

## 2021-04-22 RX ORDER — HYDROCODONE BITARTRATE AND ACETAMINOPHEN 5; 325 MG/1; MG/1
2 TABLET ORAL AS NEEDED
Status: DISCONTINUED | OUTPATIENT
Start: 2021-04-22 | End: 2021-04-22

## 2021-04-22 RX ORDER — HYDROMORPHONE HYDROCHLORIDE 1 MG/ML
0.6 INJECTION, SOLUTION INTRAMUSCULAR; INTRAVENOUS; SUBCUTANEOUS EVERY 5 MIN PRN
Status: DISCONTINUED | OUTPATIENT
Start: 2021-04-22 | End: 2021-04-22

## 2021-04-22 RX ORDER — SODIUM CHLORIDE, SODIUM LACTATE, POTASSIUM CHLORIDE, CALCIUM CHLORIDE 600; 310; 30; 20 MG/100ML; MG/100ML; MG/100ML; MG/100ML
INJECTION, SOLUTION INTRAVENOUS CONTINUOUS
Status: DISCONTINUED | OUTPATIENT
Start: 2021-04-22 | End: 2021-04-22

## 2021-04-22 RX ORDER — MORPHINE SULFATE 10 MG/ML
6 INJECTION, SOLUTION INTRAMUSCULAR; INTRAVENOUS EVERY 10 MIN PRN
Status: DISCONTINUED | OUTPATIENT
Start: 2021-04-22 | End: 2021-04-22

## 2021-04-22 RX ORDER — HYDROCODONE BITARTRATE AND ACETAMINOPHEN 5; 325 MG/1; MG/1
1 TABLET ORAL AS NEEDED
Status: DISCONTINUED | OUTPATIENT
Start: 2021-04-22 | End: 2021-04-22

## 2021-04-22 RX ORDER — PROCHLORPERAZINE EDISYLATE 5 MG/ML
5 INJECTION INTRAMUSCULAR; INTRAVENOUS ONCE AS NEEDED
Status: DISCONTINUED | OUTPATIENT
Start: 2021-04-22 | End: 2021-04-22

## 2021-04-22 RX ORDER — HYDROMORPHONE HYDROCHLORIDE 1 MG/ML
0.4 INJECTION, SOLUTION INTRAMUSCULAR; INTRAVENOUS; SUBCUTANEOUS EVERY 5 MIN PRN
Status: DISCONTINUED | OUTPATIENT
Start: 2021-04-22 | End: 2021-04-22

## 2021-04-22 RX ORDER — HALOPERIDOL 5 MG/ML
0.25 INJECTION INTRAMUSCULAR ONCE AS NEEDED
Status: DISCONTINUED | OUTPATIENT
Start: 2021-04-22 | End: 2021-04-22

## 2021-04-22 RX ORDER — MORPHINE SULFATE 4 MG/ML
2 INJECTION, SOLUTION INTRAMUSCULAR; INTRAVENOUS EVERY 10 MIN PRN
Status: DISCONTINUED | OUTPATIENT
Start: 2021-04-22 | End: 2021-04-22

## 2021-04-22 NOTE — H&P
History & Physical Examination    Patient Name: Jo Roque  MRN: [id number]  [facility]: [id number]  YOB: 1985    Diagnosis: rectal bleeding, rectal discomfort    Fluticasone Propionate 50 MCG/ACT Nasal Suspension, 2 sprays by Nasal rout

## 2021-04-22 NOTE — ANESTHESIA PREPROCEDURE EVALUATION
Anesthesia PreOp Note    HPI:     Lynn Denson is a 37year old male who presents for preoperative consultation requested by: Christian Enciso MD    Date of Surgery: 4/22/2021    Procedure(s):  COLONOSCOPY  Indication: Rectal bleeding [K62.5] Some days    Substance and Sexual Activity      Alcohol use: Never      Drug use: Yes        Types: Cannabis        Comment: CBD      Sexual activity: Not on file    Other Topics      Concerns:        Not on file    Social History Narrative      Not on adrián Resp: 15   Temp: 98.3 °F (36.8 °C)   TempSrc: Temporal   SpO2: 99%   Weight: 63 kg (139 lb)   Height: 1.6 m (5' 3\")        Anesthesia Evaluation     Patient summary reviewed and Nursing notes reviewed    Airway   Mallampati: I  Dental - normal exam

## 2021-04-22 NOTE — OPERATIVE REPORT
Colonoscopy Report    [address] 1985 [facility] 37[facility]   PCP Mellody Kehr, MD Endoscopist Diana Young MD     Date of procedure: 21    Procedure: Colonoscopy    Pre-operative diagnosis: rectal bleeding    Post-operative d procedure and remained stable.     Estimated blood loss: none    Specimens collected:  none    Complications: none     Colonoscopy findings:  Terminal ileum: normal mucosa    Cecum: normal mucosa and vascular pattern    Ascending colon: normal mucosa and va

## 2021-04-22 NOTE — ANESTHESIA POSTPROCEDURE EVALUATION
Patient: Erica Gualpla    Procedure Summary     Date: 04/22/21 Room / Location: [address] [place] [address] ENDOSCOPY 04 / [address] [place] [address] ENDOSCOPY    Anesthesia Start: 0821 Anesthesia Stop:     Procedure: COLONOSCOPY (N/A ) Diagnosis:       Rectal bleeding      Rectal discomfort

## 2021-04-27 ENCOUNTER — OFFICE VISIT (OUTPATIENT)
Dept: SURGERY | Facility: CLINIC | Age: 36
End: 2021-04-27
Payer: COMMERCIAL

## 2021-04-27 VITALS — WEIGHT: 139 LBS | HEIGHT: 63 IN | BODY MASS INDEX: 24.63 KG/M2

## 2021-04-27 DIAGNOSIS — K64.2 GRADE III HEMORRHOIDS: Primary | ICD-10-CM

## 2021-04-27 PROCEDURE — 99204 OFFICE O/P NEW MOD 45 MIN: CPT | Performed by: SURGERY

## 2021-04-27 PROCEDURE — 3008F BODY MASS INDEX DOCD: CPT | Performed by: SURGERY

## 2021-04-27 PROCEDURE — 46600 DIAGNOSTIC ANOSCOPY SPX: CPT | Performed by: SURGERY

## 2021-04-27 NOTE — H&P
HPI:    Patient ID: Brianne Clark is a 37year old male presenting with Patient presents with:  Hemorrhoids: Pt referred by Dr. Sun Martinez for hemorrhoids. Pt c/o bleeding w/ bm's  but denies constipation. Israel Jackson     HPI    Past Medical History:   Leopold Martens Friends and Family:       Frequency of Social Gatherings with Friends and Family:       Attends [employer] Services:       Active Member of Clubs or Organizations:       Attends Club or Organization Meetings:       Marital Status:   Intimate Partner Gracia Wiggins Mental Status: He is alert and oriented to person, place, and time.    Psychiatric:         Speech: Speech normal.         Behavior: Behavior normal.                 ASSESSMENT/PLAN:   Diagnoses and all orders for this visit:    Grade III hemorrhoids

## 2021-04-27 NOTE — PROCEDURES
Procedure Report    Patient Name:  Brianne Clark  MR:  [id number]  :  1985  DOS:  21    Preop Dx:   Grade III hemorrhoids  (primary encounter diagnosis)   SNOMED CT(R): INTERNAL HEMORRHOIDS GRADE III    Postop Dx:   Grade III hemorrh

## 2021-05-04 ENCOUNTER — OFFICE VISIT (OUTPATIENT)
Dept: SURGERY | Facility: CLINIC | Age: 36
End: 2021-05-04
Payer: COMMERCIAL

## 2021-05-04 VITALS
WEIGHT: 139 LBS | DIASTOLIC BLOOD PRESSURE: 67 MMHG | BODY MASS INDEX: 25 KG/M2 | SYSTOLIC BLOOD PRESSURE: 106 MMHG | HEART RATE: 59 BPM

## 2021-05-04 DIAGNOSIS — K64.2 GRADE III HEMORRHOIDS: Primary | ICD-10-CM

## 2021-05-04 PROCEDURE — 46221 LIGATION OF HEMORRHOID(S): CPT | Performed by: SURGERY

## 2021-05-04 PROCEDURE — 3074F SYST BP LT 130 MM HG: CPT | Performed by: SURGERY

## 2021-05-04 PROCEDURE — 3078F DIAST BP <80 MM HG: CPT | Performed by: SURGERY

## 2021-05-04 NOTE — PROCEDURES
Operative Report    Patient Name:  Anand Aranda  MR:  [id number]  :  1985  DOS:  21    Preop Dx:  Grade III internal hemorrhoids  Postop Dx:  Grade III internal hemorrhoids  Procedure:  Ligation of hemorrhoids (CPT 44749)  Surgeon:

## 2021-05-18 ENCOUNTER — OFFICE VISIT (OUTPATIENT)
Dept: SURGERY | Facility: CLINIC | Age: 36
End: 2021-05-18
Payer: COMMERCIAL

## 2021-05-18 VITALS
DIASTOLIC BLOOD PRESSURE: 70 MMHG | SYSTOLIC BLOOD PRESSURE: 108 MMHG | WEIGHT: 139 LBS | HEART RATE: 62 BPM | HEIGHT: 63 IN | BODY MASS INDEX: 24.63 KG/M2

## 2021-05-18 DIAGNOSIS — K64.2 GRADE III HEMORRHOIDS: Primary | ICD-10-CM

## 2021-05-18 PROCEDURE — 46221 LIGATION OF HEMORRHOID(S): CPT | Performed by: SURGERY

## 2021-05-18 PROCEDURE — 3078F DIAST BP <80 MM HG: CPT | Performed by: SURGERY

## 2021-05-18 PROCEDURE — 3074F SYST BP LT 130 MM HG: CPT | Performed by: SURGERY

## 2021-05-18 PROCEDURE — 3008F BODY MASS INDEX DOCD: CPT | Performed by: SURGERY

## 2021-05-27 NOTE — PROCEDURES
Operative Report    Patient Name:  Dawood Loyd  MR:  [id number]  :  1985  DOS:  21    Preop Dx:  Grade III internal hemorrhoids  Postop Dx:  Grade III internal hemorrhoids  Procedure:  Ligation of hemorrhoids (CPT 06720)  Surgeon:

## 2021-06-08 ENCOUNTER — OFFICE VISIT (OUTPATIENT)
Dept: SURGERY | Facility: CLINIC | Age: 36
End: 2021-06-08
Payer: COMMERCIAL

## 2021-06-08 DIAGNOSIS — K64.2 GRADE III HEMORRHOIDS: Primary | ICD-10-CM

## 2021-06-08 PROCEDURE — 46221 LIGATION OF HEMORRHOID(S): CPT | Performed by: SURGERY

## 2021-06-08 NOTE — PROCEDURES
Operative Report    Patient Name:  Kasi Nayak  MR:  [id number]  :  1985  DOS:  21    Preop Dx:  Grade III internal hemorrhoids  Postop Dx:  Grade III internal hemorrhoids  Procedure:  Ligation of hemorrhoids (CPT 86949)  Surgeon:

## 2021-06-29 ENCOUNTER — OFFICE VISIT (OUTPATIENT)
Dept: SURGERY | Facility: CLINIC | Age: 36
End: 2021-06-29
Payer: COMMERCIAL

## 2021-06-29 VITALS — WEIGHT: 139 LBS | BODY MASS INDEX: 25 KG/M2

## 2021-06-29 DIAGNOSIS — K64.2 GRADE III HEMORRHOIDS: Primary | ICD-10-CM

## 2021-06-29 PROCEDURE — 46221 LIGATION OF HEMORRHOID(S): CPT | Performed by: SURGERY

## 2021-07-01 NOTE — PROCEDURES
Operative Report    Patient Name:  Anand Aranda  MR:  [id number]  :  1985  DOS:  21    Preop Dx:  Grade III internal hemorrhoids  Postop Dx:  Grade III internal hemorrhoids  Procedure:  Ligation of hemorrhoids (CPT 95120)  Surgeon:

## 2021-08-17 ENCOUNTER — OFFICE VISIT (OUTPATIENT)
Dept: SURGERY | Facility: CLINIC | Age: 36
End: 2021-08-17
Payer: COMMERCIAL

## 2021-08-17 VITALS
HEART RATE: 69 BPM | BODY MASS INDEX: 24.1 KG/M2 | DIASTOLIC BLOOD PRESSURE: 56 MMHG | WEIGHT: 136 LBS | SYSTOLIC BLOOD PRESSURE: 85 MMHG | HEIGHT: 63 IN

## 2021-08-17 DIAGNOSIS — K64.2 GRADE III HEMORRHOIDS: Primary | ICD-10-CM

## 2021-08-17 PROCEDURE — 3074F SYST BP LT 130 MM HG: CPT | Performed by: SURGERY

## 2021-08-17 PROCEDURE — 46221 LIGATION OF HEMORRHOID(S): CPT | Performed by: SURGERY

## 2021-08-17 PROCEDURE — 3008F BODY MASS INDEX DOCD: CPT | Performed by: SURGERY

## 2021-08-17 PROCEDURE — 3078F DIAST BP <80 MM HG: CPT | Performed by: SURGERY

## 2021-08-20 NOTE — PROCEDURES
Operative Report    Patient Name:  Angelica Mejía  MR:  [id number]  :  1985  DOS:  21    Preop Dx:  Grade III internal hemorrhoids  Postop Dx:  Grade III internal hemorrhoids  Procedure:  Ligation of hemorrhoids (CPT 16293)  Surgeon:

## 2021-08-25 ENCOUNTER — TELEPHONE (OUTPATIENT)
Dept: ALLERGY | Facility: CLINIC | Age: 36
End: 2021-08-25

## 2021-08-25 DIAGNOSIS — Z91.048 OTHER NONMEDICINAL SUBSTANCE ALLERGY STATUS: Primary | ICD-10-CM

## 2021-08-25 NOTE — TELEPHONE ENCOUNTER
Valentin Samaniego, would like speak with the allergy nurse regarding the billing for the patient dated for 3-4-21. Valentin Samaniego, states that the coding does not meet medical necessity. Ok to take message per allergy nurse.

## 2021-08-25 NOTE — TELEPHONE ENCOUNTER
Spoke to Randi in billing. She reports for blood work ordered by Dr. Ольга Virk dated 3/4/2021 the diagnosis code z91.018 (food allergy) does not meet the [facility] local coverage determination edit.      Covered codes appear to be the following:  J30.5- allergi

## 2021-09-27 ENCOUNTER — TELEPHONE (OUTPATIENT)
Dept: SURGERY | Facility: CLINIC | Age: 36
End: 2021-09-27

## 2021-09-27 DIAGNOSIS — K64.8 HEMORRHOIDS, COMPLICATED: Primary | ICD-10-CM

## 2021-10-05 ENCOUNTER — TELEPHONE (OUTPATIENT)
Dept: SURGERY | Facility: CLINIC | Age: 36
End: 2021-10-05

## 2021-10-05 DIAGNOSIS — K64.9 HEMORRHOIDS, UNSPECIFIED HEMORRHOID TYPE: Primary | ICD-10-CM

## 2021-10-11 NOTE — TELEPHONE ENCOUNTER
Call from [address] in [facility] pt wants to cancel surgery for Wednesday. Surgery cancelled.

## 2021-10-18 ENCOUNTER — OFFICE VISIT (OUTPATIENT)
Dept: INTERNAL MEDICINE CLINIC | Facility: CLINIC | Age: 36
End: 2021-10-18
Payer: COMMERCIAL

## 2021-10-18 ENCOUNTER — OFFICE VISIT (OUTPATIENT)
Dept: PODIATRY CLINIC | Facility: CLINIC | Age: 36
End: 2021-10-18
Payer: COMMERCIAL

## 2021-10-18 VITALS
HEIGHT: 63 IN | WEIGHT: 134.38 LBS | BODY MASS INDEX: 23.81 KG/M2 | HEART RATE: 67 BPM | DIASTOLIC BLOOD PRESSURE: 61 MMHG | TEMPERATURE: 98 F | SYSTOLIC BLOOD PRESSURE: 104 MMHG

## 2021-10-18 DIAGNOSIS — B35.3 TINEA PEDIS OF BOTH FEET: ICD-10-CM

## 2021-10-18 DIAGNOSIS — B07.0 VERRUCA PLANTARIS: Primary | ICD-10-CM

## 2021-10-18 DIAGNOSIS — Z23 FLU VACCINE NEED: ICD-10-CM

## 2021-10-18 DIAGNOSIS — B07.0 PLANTAR WART OF RIGHT FOOT: Primary | ICD-10-CM

## 2021-10-18 PROCEDURE — 90686 IIV4 VACC NO PRSV 0.5 ML IM: CPT | Performed by: INTERNAL MEDICINE

## 2021-10-18 PROCEDURE — 3078F DIAST BP <80 MM HG: CPT | Performed by: INTERNAL MEDICINE

## 2021-10-18 PROCEDURE — 99213 OFFICE O/P EST LOW 20 MIN: CPT | Performed by: INTERNAL MEDICINE

## 2021-10-18 PROCEDURE — 90471 IMMUNIZATION ADMIN: CPT | Performed by: INTERNAL MEDICINE

## 2021-10-18 PROCEDURE — 3074F SYST BP LT 130 MM HG: CPT | Performed by: INTERNAL MEDICINE

## 2021-10-18 PROCEDURE — 3008F BODY MASS INDEX DOCD: CPT | Performed by: INTERNAL MEDICINE

## 2021-10-18 PROCEDURE — 99204 OFFICE O/P NEW MOD 45 MIN: CPT | Performed by: PODIATRIST

## 2021-10-18 PROCEDURE — 17110 DESTRUCTION B9 LES UP TO 14: CPT | Performed by: PODIATRIST

## 2021-10-18 RX ORDER — CLOTRIMAZOLE AND BETAMETHASONE DIPROPIONATE 10; .64 MG/G; MG/G
CREAM TOPICAL
Qty: 45 G | Refills: 0 | Status: SHIPPED | OUTPATIENT
Start: 2021-10-18

## 2021-10-18 NOTE — PATIENT INSTRUCTIONS
Plantar Warts  Warts are common skin growths that can appear anywhere on the body. Warts on the soles of the feet are called plantar warts. These warts are not a serious health problem. They usually go away without treatment.  But plantar warts can be discharge, or fever) appear  · You have heavy bleeding or bleeding that won’t stop with light pressure  · The wart doesn’t go away after several weeks of self-care  · New warts appear on feet, hands, or face  Lexie last reviewed this educational content provider. · Cryotherapy. Your healthcare provider puts liquid nitrogen on the wart with a cotton swab or spray. This treatment might be painful. · Medicine. A variety of medicines can be put on or injected into the wart.  But research is mixed on how well These are often applied in the healthcare provider's office. But some prescriptions may be applied at home. · Over-the-counter (OTC) topical treatments. OTC medicines that most often contain salicylic acid may be an option.  These patches, liquids, and cr intended as a substitute for professional medical care. Always follow your healthcare professional's instructions.

## 2021-10-18 NOTE — PROGRESS NOTES
History of Present Illness   Patient ID: Aaliyah Moreno is a 31year old male. Today's Date: 10/18/21  Chief Complaint: Derm Problem (wart R foot)      Warts  This is a new problem. The current episode started 1 to 4 weeks ago.  The problem occur THIN LAYER EXTERNALLY TO THE AFFECTED AREA 2 TO 4 TIMES DAILY     • Fluticasone Propionate 50 MCG/ACT Nasal Suspension 2 sprays by Nasal route daily. 3 Bottle 2       Assessment & Plan    1.  Plantar wart of right foot (Primary)  -     PODIATRY - INTERNAL warts can be painful when you stand or walk. If this is the case, special cushions can help relieve pressure and pain. Drugstores carry these cushions and you can buy them without a prescription.  If cushions don't work and the pain interferes with walking, educational content on 5/1/2018  © 3370-8455 [employer]. All rights reserved. This information is not intended as a substitute for professional medical care. Always follow your healthcare professional's instructions.         Understanding Pau on how well they work. There are many folk remedies for plantar warts, but some of these are unproven and can be dangerous. Talk with your healthcare provider about safe methods to try.  Often your healthcare provider will cut away dead parts of the wart b liquids, and creams are used at home. The medicine is applied daily to the wart and nearby skin. It's often left on overnight. The dead skin is filed down the next day. In 1 to 3 days, the procedure can be repeated.  Topical treatments are sometimes combine

## 2021-10-18 NOTE — PROGRESS NOTES
[facility], [facility] Podiatry  Progress Note    Kody Scott is a 31year old male.  Patient presents with:  Consult: Warts on right foot about a year, no pain        HPI:     This is a pleasant male that presents to clinic today due to warts on the gregory breath. EXAM:   There were no vitals taken for this visit. Constitutional:   Patient in no apparent distress. Well kept Of normal body habitus. Alert and oriented to person, place, and time. Integumentary examination:   There are no varicosities. Reviewed the various treatment options including topical medications, freezing agents, cauterization and surgical excision. Discussed warts are due to a virus. Discussed potential for return specifically to prior location.   Verruca lesions were sharply

## 2021-11-29 ENCOUNTER — OFFICE VISIT (OUTPATIENT)
Dept: SURGERY | Facility: CLINIC | Age: 36
End: 2021-11-29
Payer: COMMERCIAL

## 2021-11-29 VITALS
DIASTOLIC BLOOD PRESSURE: 64 MMHG | HEIGHT: 63 IN | SYSTOLIC BLOOD PRESSURE: 105 MMHG | TEMPERATURE: 97 F | BODY MASS INDEX: 23.92 KG/M2 | WEIGHT: 135 LBS | HEART RATE: 69 BPM

## 2021-11-29 DIAGNOSIS — K64.9 BLEEDING HEMORRHOIDS: Primary | ICD-10-CM

## 2021-11-29 PROCEDURE — 46600 DIAGNOSTIC ANOSCOPY SPX: CPT | Performed by: COLON & RECTAL SURGERY

## 2021-11-29 PROCEDURE — 3074F SYST BP LT 130 MM HG: CPT | Performed by: COLON & RECTAL SURGERY

## 2021-11-29 PROCEDURE — 3008F BODY MASS INDEX DOCD: CPT | Performed by: COLON & RECTAL SURGERY

## 2021-11-29 PROCEDURE — 99244 OFF/OP CNSLTJ NEW/EST MOD 40: CPT | Performed by: COLON & RECTAL SURGERY

## 2021-11-29 PROCEDURE — 3078F DIAST BP <80 MM HG: CPT | Performed by: COLON & RECTAL SURGERY

## 2021-12-08 NOTE — H&P
New Patient Visit Note       Active Problems      1.  Bleeding hemorrhoids        Chief Complaint   Patient presents with:  Hemorrhoids      History of Present Illness   Nicholas Lewis is a 31year old male referred by Dr. Krista Mtichell for evaluation o external hemorrhoids in the left anterior, left posterior, and right posterior locations. There is a tear/fissure just adjacent to the right posterior external hemorrhoids. Digital rectal exam was performed.  There was  good resting anal sphincter tone a

## 2021-12-17 ENCOUNTER — TELEPHONE (OUTPATIENT)
Dept: SURGERY | Facility: CLINIC | Age: 36
End: 2021-12-17

## 2021-12-17 DIAGNOSIS — K64.9 BLEEDING HEMORRHOIDS: Primary | ICD-10-CM

## 2021-12-27 ENCOUNTER — TELEPHONE (OUTPATIENT)
Dept: ALLERGY | Facility: CLINIC | Age: 36
End: 2021-12-27

## 2021-12-27 NOTE — TELEPHONE ENCOUNTER
Labs from 8/25/2021 have not been completed. Letter sent home. Postponed x 2 months. Dr. Yao Hong, if labs have not been completed in that time okay to cancel?

## 2022-01-05 ENCOUNTER — HOSPITAL ENCOUNTER (OUTPATIENT)
Age: 37
Discharge: HOME | End: 2022-01-05
Payer: COMMERCIAL

## 2022-01-05 VITALS
SYSTOLIC BLOOD PRESSURE: 120 MMHG | HEART RATE: 92 BPM | TEMPERATURE: 97 F | RESPIRATION RATE: 18 BRPM | DIASTOLIC BLOOD PRESSURE: 63 MMHG | OXYGEN SATURATION: 99 %

## 2022-01-05 DIAGNOSIS — Z20.822 LAB TEST NEGATIVE FOR COVID-19 VIRUS: ICD-10-CM

## 2022-01-05 DIAGNOSIS — J02.9 VIRAL PHARYNGITIS: Primary | ICD-10-CM

## 2022-01-05 DIAGNOSIS — Z20.822 ENCOUNTER FOR LABORATORY TESTING FOR COVID-19 VIRUS: ICD-10-CM

## 2022-01-05 LAB
S PYO AG THROAT QL: NEGATIVE
SARS-COV-2 RNA RESP QL NAA+PROBE: NOT DETECTED

## 2022-01-05 PROCEDURE — 99213 OFFICE O/P EST LOW 20 MIN: CPT | Performed by: NURSE PRACTITIONER

## 2022-01-05 PROCEDURE — 87880 STREP A ASSAY W/OPTIC: CPT | Performed by: NURSE PRACTITIONER

## 2022-01-05 PROCEDURE — U0002 COVID-19 LAB TEST NON-CDC: HCPCS | Performed by: NURSE PRACTITIONER

## 2022-01-05 NOTE — ED PROVIDER NOTES
Patient Seen in: [facility]      History   No chief complaint on file.     Stated Complaint: [id number] sore throat     Subjective:   HPI    This is a 30-year-old male presenting with sore throat for a few days vaccinated with Aysha Larios Oropharynx is clear. No oropharyngeal exudate. Comments: Slight pharyngeal erythema uvula midline no exudates  Eyes:      Conjunctiva/sclera: Conjunctivae normal.   Cardiovascular:      Rate and Rhythm: Normal rate.    Pulmonary:      Effort: Pulmonary

## 2022-01-10 ENCOUNTER — LAB ENCOUNTER (OUTPATIENT)
Dept: LAB | Age: 37
End: 2022-01-10
Attending: COLON & RECTAL SURGERY
Payer: COMMERCIAL

## 2022-01-10 DIAGNOSIS — Z01.812 ENCOUNTER FOR PREPROCEDURE SCREENING LABORATORY TESTING FOR COVID-19: ICD-10-CM

## 2022-01-10 DIAGNOSIS — Z20.822 ENCOUNTER FOR PREPROCEDURE SCREENING LABORATORY TESTING FOR COVID-19: ICD-10-CM

## 2022-01-11 ENCOUNTER — TELEPHONE (OUTPATIENT)
Dept: SURGERY | Facility: CLINIC | Age: 37
End: 2022-01-11

## 2022-01-11 DIAGNOSIS — K64.9 BLEEDING HEMORRHOIDS: Primary | ICD-10-CM

## 2022-01-11 LAB — SARS-COV-2 RNA RESP QL NAA+PROBE: DETECTED

## 2023-09-21 NOTE — TELEPHONE ENCOUNTER
Left message to call back and schedule. If patient calls back please forward call to GI Schedulers. Patient contacted twice, per protocol ok to sent letter. Letter sent via [employer]. Zheng Chen MD  You 25 minutes ago (10:21 AM)     TM  I would have his PCP order it, as they would act on it if it needs to be worked up further. I  don't get involved with PSA levels. Thanks.       You  Zheng Chen MD 34 minutes ago (10:12 AM)     [facility], this patient is asking for a PSA lab to be entered. I know that is not a standard one you would order, but I do see that you did it for him last year so I wanted to check if you want to enter it for him again per his request or if patient should ask his PCP. Thanks!      You  Nephro  Recept Msg Pool 55 minutes ago (9:51 AM)     [facility], please let patient know that would not be a lab ordered by Dr. Chen but that he should direct that request to his PCP. Thanks!

## (undated) DEVICE — LINE MNTR ADLT SET O2 INTMD

## (undated) DEVICE — 35 ML SYRINGE REGULAR TIP: Brand: MONOJECT

## (undated) DEVICE — Device: Brand: DEFENDO AIR/WATER/SUCTION AND BIOPSY VALVE

## (undated) DEVICE — Device: Brand: CUSTOM PROCEDURE KIT

## (undated) NOTE — LETTER
12/27/2021              Nicholas K[address] [place]         Dear Nakia Motta records indicate that the tests ordered for you by Sammie Mcqueen MD  have not been done.   If you have, in fact, already

## (undated) NOTE — LETTER
7/25/2020              Nicholas Handy         Dear Maliha Sánchez,    This letter is to inform you that our office has made several attempts to reach you by phone without success.   We were attempti